# Patient Record
Sex: FEMALE | Race: WHITE | NOT HISPANIC OR LATINO | URBAN - METROPOLITAN AREA
[De-identification: names, ages, dates, MRNs, and addresses within clinical notes are randomized per-mention and may not be internally consistent; named-entity substitution may affect disease eponyms.]

---

## 2020-07-14 ENCOUNTER — OFFICE VISIT (OUTPATIENT)
Dept: OBGYN | Facility: CLINIC | Age: 35
End: 2020-07-14
Payer: COMMERCIAL

## 2020-07-14 VITALS
SYSTOLIC BLOOD PRESSURE: 112 MMHG | HEART RATE: 68 BPM | WEIGHT: 202.25 LBS | DIASTOLIC BLOOD PRESSURE: 72 MMHG | TEMPERATURE: 98 F

## 2020-07-14 DIAGNOSIS — N92.6 IRREGULAR MENSES: Primary | ICD-10-CM

## 2020-07-14 LAB
ESTRADIOL SERPL-MCNC: 41 PG/ML
FSH SERPL-ACNC: 6.2 IU/L
HBA1C MFR BLD: 5.2 % (ref 0–5.6)

## 2020-07-14 PROCEDURE — 84403 ASSAY OF TOTAL TESTOSTERONE: CPT | Performed by: ADVANCED PRACTICE MIDWIFE

## 2020-07-14 PROCEDURE — 84443 ASSAY THYROID STIM HORMONE: CPT | Performed by: ADVANCED PRACTICE MIDWIFE

## 2020-07-14 PROCEDURE — 84270 ASSAY OF SEX HORMONE GLOBUL: CPT | Performed by: ADVANCED PRACTICE MIDWIFE

## 2020-07-14 PROCEDURE — 83001 ASSAY OF GONADOTROPIN (FSH): CPT | Performed by: ADVANCED PRACTICE MIDWIFE

## 2020-07-14 PROCEDURE — 82627 DEHYDROEPIANDROSTERONE: CPT | Performed by: ADVANCED PRACTICE MIDWIFE

## 2020-07-14 PROCEDURE — 83036 HEMOGLOBIN GLYCOSYLATED A1C: CPT | Performed by: ADVANCED PRACTICE MIDWIFE

## 2020-07-14 PROCEDURE — 82670 ASSAY OF TOTAL ESTRADIOL: CPT | Performed by: ADVANCED PRACTICE MIDWIFE

## 2020-07-14 PROCEDURE — 99203 OFFICE O/P NEW LOW 30 MIN: CPT | Performed by: ADVANCED PRACTICE MIDWIFE

## 2020-07-14 PROCEDURE — 36415 COLL VENOUS BLD VENIPUNCTURE: CPT | Performed by: ADVANCED PRACTICE MIDWIFE

## 2020-07-14 RX ORDER — CITALOPRAM HYDROBROMIDE 40 MG/1
40 TABLET ORAL DAILY
COMMUNITY

## 2020-07-14 RX ORDER — BUPROPION HYDROCHLORIDE 150 MG/1
150 TABLET ORAL EVERY MORNING
COMMUNITY

## 2020-07-14 NOTE — PROGRESS NOTES
Halima Lopez is a 34 year old who presents to the clinic for evaluation of irregular menses  Has a new partner, they both have children and they would like one more child together.   Menarche 14 or 15.   Initially regular cycles then at age 19 they became irregular.   Had difficulty achieving one pregnancy (the one after her SAB) which took about 3 years to get pregnant.   She is not certain of her recent menstrual history.    They have been trying for 4 months to get pregnant and she thinks she may have had 2 or 3 periods in that time.   Patient's last menstrual period was 07/03/2020 (exact date).   Two normal pregnancies, one PTB for abruption no hx of GDM  Has lost about 40 lbs recently  Partner is a smoker        Histories reviewed and updated  Past Medical History:   Diagnosis Date     Anxiety and depression      Past Surgical History:   Procedure Laterality Date     AS REMOVAL GALLBLADDER       C/SECTION, CLASSICAL       Social History     Socioeconomic History     Marital status:      Spouse name: Not on file     Number of children: Not on file     Years of education: Not on file     Highest education level: Not on file   Occupational History     Not on file   Social Needs     Financial resource strain: Not on file     Food insecurity     Worry: Not on file     Inability: Not on file     Transportation needs     Medical: Not on file     Non-medical: Not on file   Tobacco Use     Smoking status: Never Smoker     Smokeless tobacco: Never Used   Substance and Sexual Activity     Alcohol use: Yes     Comment: occ.     Drug use: Never     Sexual activity: Yes     Partners: Male     Birth control/protection: None   Lifestyle     Physical activity     Days per week: Not on file     Minutes per session: Not on file     Stress: Not on file   Relationships     Social connections     Talks on phone: Not on file     Gets together: Not on file     Attends Anabaptist service: Not on file     Active member of club or  organization: Not on file     Attends meetings of clubs or organizations: Not on file     Relationship status: Not on file     Intimate partner violence     Fear of current or ex partner: Not on file     Emotionally abused: Not on file     Physically abused: Not on file     Forced sexual activity: Not on file   Other Topics Concern     Not on file   Social History Narrative     Not on file     Family History   Problem Relation Age of Onset     Hypertension Mother      No Known Problems Father      No Known Problems Daughter      No Known Problems Son      No Known Problems Son      No Known Problems Sister      No Known Problems Brother      No Known Problems Sister      No Known Problems Brother           ROS: 10 point ROS neg other than the symptoms noted above in the HPI.    EXAM:  /72 (BP Location: Right arm, Patient Position: Sitting, Cuff Size: Adult Large)   Pulse 68   Temp 98  F (36.7  C) (Oral)   Wt 91.7 kg (202 lb 4 oz)   LMP 07/03/2020 (Exact Date)   GENERAL:  Pleasant female, no acute distress  EYES: sclera clear  RESP: breathing unlabored  CV: extremities without edema  M/S: no gross deformities  Neuro:  normal strength and sensation  Psych:  alert, with normal speech and behavior  EXAM: US PELVIS COMPLETE TA AND TV WITH DUPLEX  LOCATION: [facility]  DATE/TIME: 6/25/2020 1:14 PM    INDICATION: Vaginal Bleeding  COMPARISON: None.  TECHNIQUE: Transabdominal scans were performed. Endovaginal ultrasound was  performed to better visualize the adnexa. Color flow with spectral Doppler and  waveform analysis performed.    FINDINGS:    UTERUS: 5.5 x 4.9 x 3.8 cm. Normal in size and position with no masses. Benign  cervical nabothian cysts.    ENDOMETRIUM: 10 mm. Normal smooth endometrium.    RIGHT OVARY: 2.4 x 2.4 x 2.4 cm. Normal with arterial and venous duplex flow  identified.    LEFT OVARY: 2.3 x 2.3 x 2.1 cm. Normal with arterial and venous duplex flow  identified.    Trace free fluid in the  cul-de-sac, likely physiologic.    IMPRESSION:    1.  Normal pelvic ultrasound.      ASSESSMENT/PLAN:    (N92.6) Irregular menses  (primary encounter diagnosis)  Comment:   Plan: TSH with free T4 reflex, DHEA sulfate,         Testosterone Free and Total, Follicle         stimulating hormone, Estradiol, Hemoglobin A1c              Continue weight loss.   Encouraged partner to stop smoking.   Interested in labs to R/O PCOS.   She is ok to wait 2 years to achieve a pregnancy.   Encouraged to RTC with no menses for 6 months.   Encouraged to journal menses   RTC in  8 months if no pregnancy   Start PNV.   Follow a generally healthy life style.         Visit length 30 minutes was spent face to face with the patient today discussing her history, diagnosis, and follow-up plan as noted above.  Over 50% of the visit was spent in counseling and coordination of care.    Time noted does not include time required to complete procedures.

## 2020-07-15 LAB
DHEA-S SERPL-MCNC: 270 UG/DL (ref 35–430)
TSH SERPL DL<=0.005 MIU/L-ACNC: 0.7 MU/L (ref 0.4–4)

## 2020-07-16 LAB
SHBG SERPL-SCNC: 23 NMOL/L (ref 30–135)
TESTOST FREE SERPL-MCNC: 0.78 NG/DL (ref 0.13–0.92)
TESTOST SERPL-MCNC: 36 NG/DL (ref 8–60)

## 2020-11-18 ENCOUNTER — TELEPHONE (OUTPATIENT)
Dept: OBGYN | Facility: OTHER | Age: 35
End: 2020-11-18

## 2020-11-18 NOTE — TELEPHONE ENCOUNTER
Spoke with pt's Tomasz ashby.  He is wanting our providers to order an ultrasound for pt.  He states pt is waiting in the [facility] ER due to bleeding in early pregnancy.  Pt is experiencing some spotting. Pt had an HCG quant done earlier today and they told her she may be having a miscarriage and requested she repeat an HCG quant in 48 hours.  Pt does not want to wait and wants an ultrasound.  I explained that our providers will want to see pt first and probably due 2 HCG quants to see where her numbers are before ordering an US.  Pt's isma mentioned that pt's LMP was at the beginning of November.  I explained to him that it might be too early for an US.  I advised that pt either wait at the ER or I can assist her in scheduling a clinic visit.  Pt called him on the phone as he was waiting in the car for her while she was waiting in the lobby at [facility].  She told him that they were taking her vitals and she will just wait there.    Reshma Mcdaniel RN

## 2020-12-31 ENCOUNTER — TELEPHONE (OUTPATIENT)
Dept: BEHAVIORAL HEALTH | Facility: CLINIC | Age: 35
End: 2020-12-31

## 2020-12-31 ENCOUNTER — HOSPITAL ENCOUNTER (OUTPATIENT)
Dept: BEHAVIORAL HEALTH | Facility: CLINIC | Age: 35
End: 2020-12-31
Attending: PSYCHIATRY & NEUROLOGY
Payer: COMMERCIAL

## 2020-12-31 ENCOUNTER — MYC MEDICAL ADVICE (OUTPATIENT)
Dept: BEHAVIORAL HEALTH | Facility: CLINIC | Age: 35
End: 2020-12-31

## 2020-12-31 PROCEDURE — 999N000216 HC STATISTIC ADULT CD FACE TO FACE-NO CHRG: Mod: GT | Performed by: SOCIAL WORKER

## 2020-12-31 NOTE — PROGRESS NOTES
"Patient thought the appointment she scheduled was with a  medication management provider and states \"my medications aren't working\".  Patient reports she recently moved to [place] and saw a provider 1x for medications but the person has now retired.  This clinician offered [facility] appointments and patient agreed. Also discussed benefits of seeing an individual therapist to learn coping skills to better manage symptoms - patient said she has been thinking about seeing a therapist but just hasn't scheduled and accepted appointments today.  This clinician sent appointment reminder letters to the patient via My Chart.      Medication Management: 1/4/2021 12:00 pm Monday Cheyanne Marin PA-C [facility]      Therapy:1/1/2021 12:00 pm Friday [facility]     [facility]ERICK Henriquez, [facility]  [facility]  Phone: [phone]               "

## 2021-01-04 ENCOUNTER — HEALTH MAINTENANCE LETTER (OUTPATIENT)
Age: 36
End: 2021-01-04

## 2021-10-11 ENCOUNTER — HEALTH MAINTENANCE LETTER (OUTPATIENT)
Age: 36
End: 2021-10-11

## 2022-01-30 ENCOUNTER — HEALTH MAINTENANCE LETTER (OUTPATIENT)
Age: 37
End: 2022-01-30

## 2022-09-24 ENCOUNTER — HEALTH MAINTENANCE LETTER (OUTPATIENT)
Age: 37
End: 2022-09-24

## 2023-03-23 ENCOUNTER — APPOINTMENT (OUTPATIENT)
Dept: URGENT CARE | Facility: CLINIC | Age: 38
End: 2023-03-23
Payer: COMMERCIAL

## 2023-04-22 ENCOUNTER — HEALTH MAINTENANCE LETTER (OUTPATIENT)
Age: 38
End: 2023-04-22

## 2024-09-01 ENCOUNTER — HEALTH MAINTENANCE LETTER (OUTPATIENT)
Age: 39
End: 2024-09-01